# Patient Record
Sex: MALE | Race: WHITE | NOT HISPANIC OR LATINO
[De-identification: names, ages, dates, MRNs, and addresses within clinical notes are randomized per-mention and may not be internally consistent; named-entity substitution may affect disease eponyms.]

---

## 2018-04-08 ENCOUNTER — TRANSCRIPTION ENCOUNTER (OUTPATIENT)
Age: 58
End: 2018-04-08

## 2018-04-18 ENCOUNTER — TRANSCRIPTION ENCOUNTER (OUTPATIENT)
Age: 58
End: 2018-04-18

## 2018-04-24 PROBLEM — Z00.00 ENCOUNTER FOR PREVENTIVE HEALTH EXAMINATION: Status: ACTIVE | Noted: 2018-04-24

## 2018-05-04 ENCOUNTER — APPOINTMENT (OUTPATIENT)
Dept: CARDIOLOGY | Facility: CLINIC | Age: 58
End: 2018-05-04
Payer: OTHER MISCELLANEOUS

## 2018-05-04 VITALS
OXYGEN SATURATION: 98 % | DIASTOLIC BLOOD PRESSURE: 80 MMHG | HEART RATE: 64 BPM | WEIGHT: 170 LBS | BODY MASS INDEX: 25.18 KG/M2 | SYSTOLIC BLOOD PRESSURE: 120 MMHG | HEIGHT: 69 IN

## 2018-05-04 DIAGNOSIS — Z83.3 FAMILY HISTORY OF DIABETES MELLITUS: ICD-10-CM

## 2018-05-04 DIAGNOSIS — Z78.9 OTHER SPECIFIED HEALTH STATUS: ICD-10-CM

## 2018-05-04 DIAGNOSIS — Z82.49 FAMILY HISTORY OF ISCHEMIC HEART DISEASE AND OTHER DISEASES OF THE CIRCULATORY SYSTEM: ICD-10-CM

## 2018-05-04 DIAGNOSIS — Z82.3 FAMILY HISTORY OF STROKE: ICD-10-CM

## 2018-05-04 DIAGNOSIS — Z83.49 FAMILY HISTORY OF OTHER ENDOCRINE, NUTRITIONAL AND METABOLIC DISEASES: ICD-10-CM

## 2018-05-04 DIAGNOSIS — R05 COUGH: ICD-10-CM

## 2018-05-04 DIAGNOSIS — Z87.828 PERSONAL HISTORY OF OTHER (HEALED) PHYSICAL INJURY AND TRAUMA: ICD-10-CM

## 2018-05-04 PROCEDURE — 99243 OFF/OP CNSLTJ NEW/EST LOW 30: CPT

## 2018-05-04 RX ORDER — FLUTICASONE PROPIONATE 50 UG/1
50 SPRAY, METERED NASAL
Refills: 0 | Status: ACTIVE | COMMUNITY

## 2018-05-04 RX ORDER — CETIRIZINE HCL 10 MG
10 TABLET ORAL
Refills: 0 | Status: ACTIVE | COMMUNITY

## 2018-06-08 ENCOUNTER — APPOINTMENT (OUTPATIENT)
Dept: CARDIOLOGY | Facility: CLINIC | Age: 58
End: 2018-06-08

## 2018-06-20 ENCOUNTER — APPOINTMENT (OUTPATIENT)
Dept: CARDIOLOGY | Facility: CLINIC | Age: 58
End: 2018-06-20

## 2018-07-06 ENCOUNTER — APPOINTMENT (OUTPATIENT)
Dept: CARDIOLOGY | Facility: CLINIC | Age: 58
End: 2018-07-06

## 2019-01-08 ENCOUNTER — TRANSCRIPTION ENCOUNTER (OUTPATIENT)
Age: 59
End: 2019-01-08

## 2019-05-10 ENCOUNTER — APPOINTMENT (OUTPATIENT)
Dept: CARDIOLOGY | Facility: CLINIC | Age: 59
End: 2019-05-10
Payer: OTHER MISCELLANEOUS

## 2019-05-10 VITALS
DIASTOLIC BLOOD PRESSURE: 80 MMHG | WEIGHT: 170 LBS | SYSTOLIC BLOOD PRESSURE: 120 MMHG | HEIGHT: 69 IN | HEART RATE: 76 BPM | BODY MASS INDEX: 25.18 KG/M2

## 2019-05-10 DIAGNOSIS — E78.5 HYPERLIPIDEMIA, UNSPECIFIED: ICD-10-CM

## 2019-05-10 DIAGNOSIS — R94.01 ABNORMAL ELECTROENCEPHALOGRAM [EEG]: ICD-10-CM

## 2019-05-10 DIAGNOSIS — R07.9 CHEST PAIN, UNSPECIFIED: ICD-10-CM

## 2019-05-10 PROCEDURE — 99214 OFFICE O/P EST MOD 30 MIN: CPT

## 2019-05-10 NOTE — PHYSICAL EXAM
[Normal Appearance] : normal appearance [General Appearance - Well Developed] : well developed [Well Groomed] : well groomed [No Deformities] : no deformities [General Appearance - Well Nourished] : well nourished [General Appearance - In No Acute Distress] : no acute distress [Normal Conjunctiva] : the conjunctiva exhibited no abnormalities [Eyelids - No Xanthelasma] : the eyelids demonstrated no xanthelasmas [Normal Oral Mucosa] : normal oral mucosa [No Oral Pallor] : no oral pallor [No Oral Cyanosis] : no oral cyanosis [Normal Jugular Venous A Waves Present] : normal jugular venous A waves present [Normal Jugular Venous V Waves Present] : normal jugular venous V waves present [No Jugular Venous Hung A Waves] : no jugular venous hung A waves [Respiration, Rhythm And Depth] : normal respiratory rhythm and effort [Exaggerated Use Of Accessory Muscles For Inspiration] : no accessory muscle use [Auscultation Breath Sounds / Voice Sounds] : lungs were clear to auscultation bilaterally [Heart Rate And Rhythm] : heart rate and rhythm were normal [Heart Sounds] : normal S1 and S2 [Murmurs] : no murmurs present [Abdomen Soft] : soft [Abdomen Tenderness] : non-tender [Abdomen Mass (___ Cm)] : no abdominal mass palpated [Abnormal Walk] : normal gait [Gait - Sufficient For Exercise Testing] : the gait was sufficient for exercise testing [Cyanosis, Localized] : no localized cyanosis [Nail Clubbing] : no clubbing of the fingernails [Petechial Hemorrhages (___cm)] : no petechial hemorrhages [] : no rash [Skin Color & Pigmentation] : normal skin color and pigmentation [Skin Lesions] : no skin lesions [No Venous Stasis] : no venous stasis [No Skin Ulcers] : no skin ulcer [No Xanthoma] : no  xanthoma was observed [Oriented To Time, Place, And Person] : oriented to person, place, and time [Affect] : the affect was normal [No Anxiety] : not feeling anxious [Mood] : the mood was normal

## 2019-05-10 NOTE — REASON FOR VISIT
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 \par \par Patient has recurrent chest pain, sharp sensation, upper chest and throat, moderate intensity lasting several minutes occurring at random times.  Chest pain is nonradiating nonreproducible, occurring on a daily basis. Cardiovascular review of symptoms is negative for exertional dyspnea, palpitations, dizziness or syncope.  No PND or orthopnea leg edema.  No bleeding or black stool.\par \par Patient is walking 15 minutes with exertional chest pain.  Patient states he walks up to 2 miles per day.  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  Currently following with a pulmonologist at [facility]. \par \par Patient was evaluated in the [facility] ER 1/8/19 for chest pain, EKG sinus rhythm IRBBB, normal labs including troponin patient discharged for outpatient cardiology followup.  \par \par Patient initially seen in cardiology evaluation May of 2018 for chest pain with recommendation for stress test, patient did not followup at that time\par \par No prior cardiac history, ischemic heart disease, arrhythmia, valvular heart disease, no prior cardiology evalution.Patient has rnt chest pain sharp sensation upper chest \par \par Patient is physically active walking his dog more than 3 miles without exertional chest pain.\par \par EKG 1/8/19 sinus rhythm 64 bpm, IRBBB \par \par EKG 4/18/18 sinus rhythm RSR V1, left atrial abnormality\par \par Labs 4/24/18, normal TSH and CBC, fasting cholesterol 228, HDL 48, , triglyceride 183\par

## 2019-05-10 NOTE — ASSESSMENT
[FreeTextEntry1] : Charly is a 58-year-old male with medical history detailed above and active medical issues including:\par \par -Recurrent chest pain, borderline abnormal baseline EKG, CAD risk factors. Patient will have noninvasive testing with a exercise Myoview stress test to assess for obstructive CAD, exercise-induced arrhythmia,  blood pressure response, 2DEcho for LVEF, wall motion, structural heart disease,  carotid and abdominal ultrasound to assess for obstructive PAD. \par \par - Environmental exposure to wood dust with coughing and chest pain\par \par -Borderline dyslipidemia, repeat labs pending on a low-fat diet\par \par -Borderline family history mature CAD, father at age 78 CABG and postop stroke\par \par -Reflux GERD symptoms improved on current meds\par \par Patient will be seen in cardiology follow-up after noninvasive testing.\par \par Charly follow up with Dr Ryan Silva for primary care\par

## 2019-05-10 NOTE — REVIEW OF SYSTEMS
[Chest  Pressure] : chest pressure [Chest Pain] : chest pain [Cough] : cough [Negative] : Heme/Lymph [Shortness Of Breath] : no shortness of breath [Lower Ext Edema] : no extremity edema [Dyspnea on exertion] : not dyspnea during exertion [Leg Claudication] : no intermittent leg claudication [Palpitations] : no palpitations [Wheezing] : no wheezing [Coughing Up Blood] : no hemoptysis

## 2019-05-31 ENCOUNTER — APPOINTMENT (OUTPATIENT)
Dept: CARDIOLOGY | Facility: CLINIC | Age: 59
End: 2019-05-31

## 2019-06-21 ENCOUNTER — APPOINTMENT (OUTPATIENT)
Dept: CARDIOLOGY | Facility: CLINIC | Age: 59
End: 2019-06-21

## 2020-01-27 ENCOUNTER — APPOINTMENT (OUTPATIENT)
Dept: ORTHOPEDIC SURGERY | Facility: CLINIC | Age: 60
End: 2020-01-27
Payer: COMMERCIAL

## 2020-01-27 VITALS
WEIGHT: 172 LBS | DIASTOLIC BLOOD PRESSURE: 99 MMHG | HEIGHT: 69 IN | BODY MASS INDEX: 25.48 KG/M2 | SYSTOLIC BLOOD PRESSURE: 143 MMHG | HEART RATE: 90 BPM

## 2020-01-27 PROCEDURE — 73080 X-RAY EXAM OF ELBOW: CPT | Mod: RT

## 2020-01-27 PROCEDURE — 99203 OFFICE O/P NEW LOW 30 MIN: CPT

## 2020-01-27 NOTE — ADDENDUM
[FreeTextEntry1] : I, Armin Peña, acted solely as a scribe for Dr. Shoemaker on this date 01/27/2020.\par

## 2020-01-27 NOTE — PHYSICAL EXAM
[de-identified] : - Constitutional: This is a male in no obvious distress.  \par - Psych: Patient is alert and oriented to person, place and time.  Patient has a normal mood and affect.\par - Cardiovascular: Normal pulses throughout the upper extremities.  No significant varicosities are noted in the upper extremities. \par - Neuro: Strength and sensation are intact throughout the upper extremities.  Patient has normal coordination.\par - Respiratory:  Patient exhibits no evidence of shortness of breath or difficulty breathing.\par - Skin: No rashes, lesions, or other abnormalities are noted in the upper extremities.\par \par ---\par \par Examination of his right elbow demonstrates swelling of the olecranon bursa.  He has tenderness where he appears to have 2 small rice or loose bodies.  These are quite tender.  He has full flexion and extension.  He is neurovascular intact distally.\par \par Examination of his left hand demonstrates a Dupuytren's disease with a longitudinal cord at the middle finger ray with a 20 degree MCP contracture.  He has full flexion of the digits.  He is neurovascularly intact distally.\par \par Examination of his right hand demonstrates thickening of the palmar fascia consistent with Dupuytren's disease without a contracture. [de-identified] : AP, lateral and oblique radiographs of his right elbow demonstrate no obvious fractures, dislocations or evidence of an olecranon bone spur.

## 2020-01-27 NOTE — HISTORY OF PRESENT ILLNESS
[Right] : right hand dominant [FreeTextEntry1] : He comes in today for evaluation of right elbow sensitivity secondary to a fall that occurred 1 month ago. He reports that he fell down a staircase. He states his elbow is sensitive and has pain with palpation.  He is also noted swelling.\par \par He also comes in for evaluation of left hand Dupuytren's contracture.  He has had this for 20 years.\par \par He works as a  and a commercial .

## 2020-01-27 NOTE — END OF VISIT
[FreeTextEntry3] : All medical record entries made by the Scribe were at my, Dr. Shoemaker, direction and personally dictated by me on 01/27/2020. I have reviewed the chart and agree that the record accurately reflects my personal performances of the history, physical exam, assessment, and plan. I have also personally directed, reviewed, and agreed with the chart.\par

## 2020-01-27 NOTE — DISCUSSION/SUMMARY
[FreeTextEntry1] : He has findings consistent with right elbow olecranon bursitis and left Dupuytren's contracture.\par \par I had a discussion regarding today's visit, the diagnosis, and treatment recommendations / options. With regard to his right elbow, we discussed treatment options.  I told him that either we can leave this alone and he can avoid leaning on his elbow and wear a compressive strap, I can try aspiration, or even surgical excision of the bursal sac and rice bodies.  At this time he would like to see if this improves on its own.  He is a  and could not consider surgery.  I told him the only reason to do surgery would be if his symptoms warrant.  He would like to see if he improves, which he has somewhat improved, with time.  If he does not, then he will follow-up to discuss further treatment options.\par \par With regard to his left middle finger contracture secondary to Dupuytren's disease, I recommended initially a Xiaflex injection.  He told me that he would like to proceed but he will have to figure out a time when he can take several weeks off of work as a .  He will call the office when he is ready to schedule this.\par \par The patient has agreed to this plan of management and has expressed full understanding.  All questions were fully answered to the patient's satisfaction.\par \par Over 50% of the time spent with the patient was on counseling the patient on the above diagnosis, treatment plan and prognosis.\par

## 2021-02-08 ENCOUNTER — APPOINTMENT (OUTPATIENT)
Dept: ORTHOPEDIC SURGERY | Facility: CLINIC | Age: 61
End: 2021-02-08
Payer: COMMERCIAL

## 2021-02-08 PROBLEM — M70.21 OLECRANON BURSITIS OF RIGHT ELBOW: Status: ACTIVE | Noted: 2020-01-27

## 2021-02-11 ENCOUNTER — APPOINTMENT (OUTPATIENT)
Dept: ORTHOPEDIC SURGERY | Facility: CLINIC | Age: 61
End: 2021-02-11
Payer: COMMERCIAL

## 2021-02-11 VITALS — WEIGHT: 172 LBS | TEMPERATURE: 97.7 F | HEIGHT: 69 IN | BODY MASS INDEX: 25.48 KG/M2

## 2021-02-11 DIAGNOSIS — M70.21 OLECRANON BURSITIS, RIGHT ELBOW: ICD-10-CM

## 2021-02-11 PROCEDURE — 99214 OFFICE O/P EST MOD 30 MIN: CPT

## 2021-02-11 PROCEDURE — 99072 ADDL SUPL MATRL&STAF TM PHE: CPT

## 2021-02-11 RX ORDER — AMOXICILLIN 500 MG/1
500 CAPSULE ORAL
Qty: 21 | Refills: 0 | Status: ACTIVE | COMMUNITY
Start: 2020-11-03

## 2021-02-11 RX ORDER — PANTOPRAZOLE 40 MG/1
40 TABLET, DELAYED RELEASE ORAL
Qty: 90 | Refills: 0 | Status: ACTIVE | COMMUNITY
Start: 2020-12-18

## 2021-02-11 RX ORDER — OFLOXACIN 3 MG/ML
0.3 SOLUTION/ DROPS OPHTHALMIC
Qty: 5 | Refills: 0 | Status: ACTIVE | COMMUNITY
Start: 2020-11-09

## 2021-02-11 RX ORDER — IBUPROFEN 600 MG/1
600 TABLET ORAL
Qty: 20 | Refills: 0 | Status: ACTIVE | COMMUNITY
Start: 2020-11-03

## 2021-02-11 RX ORDER — BENZONATATE 100 MG/1
100 CAPSULE ORAL
Qty: 30 | Refills: 0 | Status: ACTIVE | COMMUNITY
Start: 2020-12-03

## 2021-03-10 ENCOUNTER — APPOINTMENT (OUTPATIENT)
Dept: CT IMAGING | Facility: CLINIC | Age: 61
End: 2021-03-10
Payer: COMMERCIAL

## 2021-03-10 PROCEDURE — 71250 CT THORAX DX C-: CPT

## 2021-03-16 NOTE — DISCUSSION/SUMMARY
[FreeTextEntry1] : I had a discussion regarding today's visit, the diagnosis and treatment recommendations / options.  At this time, he would like to go ahead and schedule a Xiaflex injection for the left middle finger Dupuytren's contracture.  I discussed associated risks and benefits.  He agreed to proceed.  He understands that this may not ultimately be successful.  He will follow-up once the Xiaflex is approved by his insurance.\par \par The patient has agreed to the above plan of management and has expressed full understanding.  All questions were fully answered to the patient's satisfaction.\par \par My time spent on this visit included: Preparation for the visit, review of the medical records, review of pertinent diagnostic studies, examination and counseling of the patient on the above diagnosis, treatment plan and prognosis, orders of diagnostic tests, medications and/or appropriate procedures and documentation in the medical records of today's visit.

## 2021-03-16 NOTE — HISTORY OF PRESENT ILLNESS
[FreeTextEntry1] : Follow-up regarding right olecranon bursitis and left middle finger contracture secondary to Dupuytren's disease.  See note from when he was seen in the office greater than 1 year ago.  We discussed a Xiaflex injection for the left ring finger.\par \par He returns today, as he would like to schedule his left middle finger Xiaflex injection.  He states that the right elbow olecranon bursitis is no longer bothering him.\par \par He works as a  and a commercial .

## 2021-03-16 NOTE — PHYSICAL EXAM
[de-identified] : - Constitutional: This is a male in no obvious distress.  \par - Psych: Patient is alert and oriented to person, place and time.  Patient has a normal mood and affect.\par - Cardiovascular: Normal pulses throughout the upper extremities.  No significant varicosities are noted in the upper extremities. \par - Neuro: Strength and sensation are intact throughout the upper extremities.  Patient has normal coordination.\par - Respiratory:  Patient exhibits no evidence of shortness of breath or difficulty breathing.\par - Skin: No rashes, lesions, or other abnormalities are noted in the upper extremities.\par \par ---\par \par Examination of his left hand demonstrates a Dupuytren's disease with a longitudinal cord at the middle finger ray with a 25 degree MCP contracture.  On table top testing, he cannot flatten his hand down on the table top.  He has full flexion of the digits.  He is neurovascularly intact distally.\par \par Examination of his right hand demonstrates thickening of the palmar fascia consistent with Dupuytren's disease without a contracture. [de-identified] : Previous AP, lateral and oblique radiographs of his right elbow demonstrated no obvious fractures, dislocations or evidence of an olecranon bone spur.

## 2021-04-26 ENCOUNTER — APPOINTMENT (OUTPATIENT)
Dept: ORTHOPEDIC SURGERY | Facility: CLINIC | Age: 61
End: 2021-04-26
Payer: COMMERCIAL

## 2021-04-29 ENCOUNTER — APPOINTMENT (OUTPATIENT)
Dept: ORTHOPEDIC SURGERY | Facility: CLINIC | Age: 61
End: 2021-04-29
Payer: COMMERCIAL

## 2021-05-10 ENCOUNTER — APPOINTMENT (OUTPATIENT)
Dept: ORTHOPEDIC SURGERY | Facility: CLINIC | Age: 61
End: 2021-05-10
Payer: COMMERCIAL

## 2021-05-10 PROCEDURE — 99214 OFFICE O/P EST MOD 30 MIN: CPT | Mod: 25

## 2021-05-10 PROCEDURE — 20527 NJX NZM PALMAR FASCIAL CORD: CPT | Mod: F2

## 2021-05-10 PROCEDURE — 99072 ADDL SUPL MATRL&STAF TM PHE: CPT

## 2021-05-10 NOTE — HISTORY OF PRESENT ILLNESS
[FreeTextEntry1] : Follow-up regarding left middle finger contracture secondary to Dupuytren's disease.  See note from when he was seen in the office 2-1/2 months ago.  He is here for Xiaflex injection.\par \par He works as a  and a commercial .

## 2021-05-10 NOTE — PHYSICAL EXAM
[de-identified] : - Constitutional: This is a male in no obvious distress.  \par - Psych: Patient is alert and oriented to person, place and time.  Patient has a normal mood and affect.\par - Cardiovascular: Normal pulses throughout the upper extremities.  No significant varicosities are noted in the upper extremities. \par - Neuro: Strength and sensation are intact throughout the upper extremities.  Patient has normal coordination.\par - Respiratory:  Patient exhibits no evidence of shortness of breath or difficulty breathing.\par - Skin: No rashes, lesions, or other abnormalities are noted in the upper extremities.\par \par ---\par \par Examination of his left hand demonstrates a Dupuytren's disease with a longitudinal cord at the middle finger ray with a 25 degree MCP contracture.  On table top testing, he cannot flatten his hand down on the table top.  He has full flexion of the digits.  He is neurovascularly intact distally.\par \par Examination of his right hand demonstrates thickening of the palmar fascia consistent with Dupuytren's disease without a contracture. [de-identified] : Previous AP, lateral and oblique radiographs of his right elbow demonstrated no obvious fractures, dislocations or evidence of an olecranon bone spur.

## 2021-05-10 NOTE — PROCEDURE
[FreeTextEntry1] : Using sterile technique, his left middle finger was injected with 0.25 cc of reconstituted Xiaflex solution.  He tolerated the procedure well without complications.\par \par He was instructed on elevation ice and activity modification.  He will follow-up in 3 days for attempted rupture of the cord.

## 2021-05-10 NOTE — DISCUSSION/SUMMARY
[FreeTextEntry1] : I had a discussion regarding today's visit, the diagnosis and treatment recommendations / options.  At this time, he agreed to proceed with a Xiaflex injection at the left middle finger.\par \par I discussed associated risks and benefits inherent in a Xiaflex injection, including, but not limited to, infection, possible digital nerve or vessel injury, possible flexor tendon injury and/or rupture, possible skin tearing during rupture of the cord, possible pain, swelling and ecchymosis of the hand and upper extremity and possible proximal lymphadenopathy.  I also discussed the possibility that the Xiaflex injection may not be successful and that if it is not, then further treatment, including surgery, may be necessary.  We also discussed the chance of recurrence of the Dupuytren's disease and contracture.\par \par The patient has agreed to the above plan of management and has expressed full understanding.  All questions were fully answered to the patient's satisfaction.\par \par My cumulative time spent on this visit included: Preparation for the visit, review of the medical records, review of pertinent diagnostic studies, examination and counseling of the patient on the above diagnosis, treatment plan and prognosis, orders of diagnostic tests, medications and/or appropriate procedures and documentation in the medical records of today's visit.

## 2021-05-13 ENCOUNTER — APPOINTMENT (OUTPATIENT)
Dept: ORTHOPEDIC SURGERY | Facility: CLINIC | Age: 61
End: 2021-05-13
Payer: COMMERCIAL

## 2021-05-13 VITALS — TEMPERATURE: 96.6 F | HEIGHT: 69 IN | WEIGHT: 172 LBS | BODY MASS INDEX: 25.48 KG/M2

## 2021-05-13 PROCEDURE — 20600 DRAIN/INJ JOINT/BURSA W/O US: CPT | Mod: F2

## 2021-05-13 PROCEDURE — 99072 ADDL SUPL MATRL&STAF TM PHE: CPT

## 2021-05-13 PROCEDURE — 99214 OFFICE O/P EST MOD 30 MIN: CPT | Mod: 25

## 2021-05-13 PROCEDURE — 26341 MANIPULAT PALM CORD POST INJ: CPT | Mod: F2

## 2021-05-13 PROCEDURE — 26341 MANIPULAT PALM CORD POST INJ: CPT | Mod: LT

## 2021-05-13 RX ORDER — FLUTICASONE FUROATE AND VILANTEROL TRIFENATATE 200; 25 UG/1; UG/1
200-25 POWDER RESPIRATORY (INHALATION)
Qty: 60 | Refills: 0 | Status: ACTIVE | COMMUNITY
Start: 2021-04-29

## 2021-05-13 RX ORDER — ALBUTEROL SULFATE 90 UG/1
108 (90 BASE) INHALANT RESPIRATORY (INHALATION)
Qty: 8 | Refills: 0 | Status: ACTIVE | COMMUNITY
Start: 2021-04-29

## 2021-05-13 RX ORDER — COLLAGENASE CLOSTRIDIUM HISTOLYTICUM 0.9 MG
0.9 KIT INJECTION
Qty: 1 | Refills: 0 | Status: ACTIVE | COMMUNITY
Start: 2021-04-14

## 2021-05-13 RX ORDER — FAMOTIDINE 40 MG/1
40 TABLET, FILM COATED ORAL
Qty: 30 | Refills: 0 | Status: ACTIVE | COMMUNITY
Start: 2021-04-29

## 2021-05-13 RX ADMIN — Medication %: at 00:00

## 2021-05-13 NOTE — HISTORY OF PRESENT ILLNESS
[FreeTextEntry1] : 3 days status post left middle finger Xiaflex injection for Dupuytren's disease.  He is here for rupture of the cord.\par \par He is doing well.  He has some swelling and pain.\par \par He works as a  and a commercial .

## 2021-05-13 NOTE — PROCEDURE
[FreeTextEntry1] : Using sterile technique, a left middle finger digital block was performed with 1% plain lidocaine.  Using standard technique, the cord was ruptured.  Near full extension was achieved with a mild residual PIP joint flexion contracture.\par \par He was instructed flexion extension exercises, elevation and activity modification.  He will follow-up in 2 weeks to assess his progress.

## 2021-05-13 NOTE — PHYSICAL EXAM
[de-identified] : - Constitutional: This is a male in no obvious distress.  \par - Psych: Patient is alert and oriented to person, place and time.  Patient has a normal mood and affect.\par - Cardiovascular: Normal pulses throughout the upper extremities.  No significant varicosities are noted in the upper extremities. \par - Neuro: Strength and sensation are intact throughout the upper extremities.  Patient has normal coordination.\par - Respiratory:  Patient exhibits no evidence of shortness of breath or difficulty breathing.\par - Skin: No rashes, lesions, or other abnormalities are noted in the upper extremities.\par \par ---\par \par Examination of his left hand demonstrates mild diffuse swelling and ecchymosis.  The middle finger cord has not ruptured.  His flexor tendons are intact.  He is neurovascularly intact distally. [de-identified] : Previous AP, lateral and oblique radiographs of his right elbow demonstrated no obvious fractures, dislocations or evidence of an olecranon bone spur.

## 2021-05-13 NOTE — DISCUSSION/SUMMARY
[FreeTextEntry1] : I had a discussion regarding today's visit, the diagnosis and treatment recommendations / options.  At this, he agreed to proceed with attempted rupture of the cord.\par \par The patient has agreed to the above plan of management and has expressed full understanding.  All questions were fully answered to the patient's satisfaction.\par \par My cumulative time spent on this visit included: Preparation for the visit, review of the medical records, review of pertinent diagnostic studies, examination and counseling of the patient on the above diagnosis, treatment plan and prognosis, orders of diagnostic tests, medications and/or appropriate procedures and documentation in the medical records of today's visit.

## 2021-05-20 PROBLEM — M72.0 DUPUYTREN'S CONTRACTURE: Status: ACTIVE | Noted: 2020-01-27

## 2021-05-27 ENCOUNTER — APPOINTMENT (OUTPATIENT)
Dept: ORTHOPEDIC SURGERY | Facility: CLINIC | Age: 61
End: 2021-05-27
Payer: COMMERCIAL

## 2021-05-27 VITALS — BODY MASS INDEX: 25.48 KG/M2 | HEIGHT: 69 IN | WEIGHT: 172 LBS | TEMPERATURE: 96.8 F

## 2021-05-27 DIAGNOSIS — M72.0 PALMAR FASCIAL FIBROMATOSIS [DUPUYTREN]: ICD-10-CM

## 2021-05-27 PROCEDURE — 99213 OFFICE O/P EST LOW 20 MIN: CPT

## 2021-05-27 PROCEDURE — 99072 ADDL SUPL MATRL&STAF TM PHE: CPT

## 2021-05-27 NOTE — PHYSICAL EXAM
[de-identified] : - Constitutional: This is a male in no obvious distress.  \par - Psych: Patient is alert and oriented to person, place and time.  Patient has a normal mood and affect.\par - Cardiovascular: Normal pulses throughout the upper extremities.  No significant varicosities are noted in the upper extremities. \par - Neuro: Strength and sensation are intact throughout the upper extremities.  Patient has normal coordination.\par - Respiratory:  Patient exhibits no evidence of shortness of breath or difficulty breathing.\par - Skin: No rashes, lesions, or other abnormalities are noted in the upper extremities.\par \par ---\par \par Examination of his left hand demonstrates minimal swelling.  His middle finger cord has ruptured.  He has some mild residual middle finger PIP joint flexion contracture.  He can almost fully flatten his palm down on the tabletop.  He has full flexion.  His flexor tendons are intact.  His flexor tendons are intact.  He is neurovascularly intact distally. [de-identified] : Previous AP, lateral and oblique radiographs of his right elbow demonstrated no obvious fractures, dislocations or evidence of an olecranon bone spur.

## 2021-05-27 NOTE — HISTORY OF PRESENT ILLNESS
[FreeTextEntry1] : 2 weeks status post left middle finger cord rupture and 17 days status post left middle finger Xiaflex injection for Dupuytren's disease.  \par \par He is doing well.  He has some soreness.  He is also noted intermittent numbness in his thumb for the past 4 days.\par \par He works as a  and a commercial .

## 2021-05-27 NOTE — DISCUSSION/SUMMARY
[FreeTextEntry1] : I had a discussion regarding today's visit, the diagnosis and treatment recommendations and options.  We also discussed changes since the last visit.  He was instructed on continued active active assisted and passive flexion and extension exercises, concentrating on extension.  He was also instructed on scar massage and desensitization.  With regard to the left thumb, I told him that if he notices persistent numbness and/or tingling in his thumb or other fingers, he should return to the office.  Otherwise, he will follow-up according to his symptoms.\par \par The patient has agreed to the above plan of management and has expressed full understanding.  All questions were fully answered to the patient's satisfaction.\par \par My cumulative time spent on today's included: Preparation for the visit, review of the medical records, review of pertinent diagnostic studies, examination and counseling of the patient on the above diagnosis, treatment plan and prognosis, orders of diagnostic tests, medications and/or appropriate procedures and documentation in the medical records of today's visit.

## 2022-10-05 ENCOUNTER — NON-APPOINTMENT (OUTPATIENT)
Age: 62
End: 2022-10-05